# Patient Record
Sex: MALE | Race: WHITE
[De-identification: names, ages, dates, MRNs, and addresses within clinical notes are randomized per-mention and may not be internally consistent; named-entity substitution may affect disease eponyms.]

---

## 2020-09-05 ENCOUNTER — HOSPITAL ENCOUNTER (EMERGENCY)
Dept: HOSPITAL 61 - ER | Age: 30
Discharge: HOME | End: 2020-09-05
Payer: SELF-PAY

## 2020-09-05 VITALS — WEIGHT: 198.42 LBS | BODY MASS INDEX: 25.46 KG/M2 | HEIGHT: 74 IN

## 2020-09-05 VITALS — SYSTOLIC BLOOD PRESSURE: 147 MMHG | DIASTOLIC BLOOD PRESSURE: 74 MMHG
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
## 2020-09-05 DIAGNOSIS — R60.0: ICD-10-CM
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
## 2020-09-05 DIAGNOSIS — F17.200: ICD-10-CM

## 2020-09-05 DIAGNOSIS — Z90.89: ICD-10-CM

## 2020-09-05 DIAGNOSIS — S93.492A: Primary | ICD-10-CM
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
## 2020-09-05 PROCEDURE — 73610 X-RAY EXAM OF ANKLE: CPT

## 2020-09-05 PROCEDURE — 99284 EMERGENCY DEPT VISIT MOD MDM: CPT

## 2020-09-05 PROCEDURE — L4350 ANKLE CONTROL ORTHO PRE OTS: HCPCS

## 2020-09-05 NOTE — RAD
ANKLE LEFT 3V 9/5/2020 5:09 PM

 

INDICATION: Pain

 

COMPARISON: None available.

 

TECHNIQUE:  3 views of the left ankle are provided.

 

FINDINGS/

IMPRESSION:

There is no acute fracture or dislocation. Joint spaces are maintained. 

Bone mineralization is within normal limits. Regional soft tissues are 

within normal limits. There is no soft tissue gas or osseous erosion. No 

radiopaque foreign body.

 

Electronically signed by: Nancy Gasca MD (9/5/2020 5:31 PM) 

[facility]

## 2020-09-05 NOTE — PHYS DOC
Past Medical History


Past Medical History:  No Pertinent History


Past Surgical History:  Appendectomy


Smoking Status:  Current Every Day Smoker


Alcohol Use:  Occasionally





General Adult


EDM:


Chief Complaint:  ANKLE PROBLEM





HPI:


HPI:





Patient is a 29  year old male who presents to the ED today complaining of mild 

intermittent left lateral ankle pain that began around noon when he fell into a 

hole.  Patient denies hitting his head on the ground.  He states his ankle 

twisted.  Reports the ankle pain is throbbing and intermittent worse on 

ambulation.  Denies anything specifically relieving his pain.





Review of Systems:


Review of Systems:


Constitutional:   Denies fever or chills. []





Musculoskeletal:   Reports left ankle pain


Integument:   Denies rash. [] 


Neurologic:   Denies headache, focal weakness or sensory changes. [] 


 


Psychiatric:  Denies depression or anxiety. []





Heart Score:


Risk Factors:


Risk Factors:  DM, Current or recent (<one month) smoker, HTN, HLP, family 

history of CAD, obesity.


Risk Scores:


Score 0 - 3:  2.5% MACE over next 6 weeks - Discharge Home


Score 4 - 6:  20.3% MACE over next 6 weeks - Admit for Clinical Observation


Score 7 - 10:  72.7% MACE over next 6 weeks - Early Invasive Strategies





Current Medications:





Current Medications








 Medications


  (Trade)  Dose


 Ordered  Sig/Abeba  Start Time


 Stop Time Status Last Admin


Dose Admin


 


 Acetaminophen/


 Hydrocodone Bitart


  (Lortab 5/325)  1 tab  1X  ONCE  9/5/20 18:00


 9/5/20 18:01 DC 9/5/20 18:14


1 TAB


 


 Naproxen


  (Naprosyn)  500 mg  1X  STAT  9/5/20 17:27


 9/5/20 17:42 DC 9/5/20 18:14


500 MG











Allergies:


Allergies:





Allergies








Coded Allergies Type Severity Reaction Last Updated Verified


 


  No Known Drug Allergies    9/5/20 No











Physical Exam:


PE:





Constitutional: Well developed, well nourished, no acute distress, non-toxic 

appearance. []


Extremities: Left ankle with no obvious deformity.  Soft tissue swelling noted. 

 Tenderness on palpation of the left lateral ankle.  Full active as well as 

passive range of motion to the left ankle.  +2 left pedal pulse.  Cap refill 

less than 2 seconds to left toes.


Neurologic: Alert and oriented X 3, normal motor function, normal sensory 

function, no focal deficits noted. []


Psychologic: Affect normal, judgement normal, mood normal. []





Current Patient Data:


Vital Signs:





                                   Vital Signs








  Date Time  Temp Pulse Resp B/P (MAP) Pulse Ox O2 Delivery O2 Flow Rate FiO2


 


9/5/20 18:14      Room Air  


 


9/5/20 17:09 100.7 122 20 147/74 (98) 98   





 100.7       











EKG:


EKG:


[]





Radiology/Procedures:


Radiology/Procedures:


[]PROCEDURE: ANKLE LEFT 3V





ANKLE LEFT 3V 9/5/2020 5:09 PM


 


INDICATION: Pain


 


COMPARISON: None available.


 


TECHNIQUE:  3 views of the left ankle are provided.


 


FINDINGS/


IMPRESSION:


There is no acute fracture or dislocation. Joint spaces are maintained. 


Bone mineralization is within normal limits. Regional soft tissues are 


within normal limits. There is no soft tissue gas or osseous erosion. No 


radiopaque foreign body.


 


Electronically signed by: Ritika Gasca MD (9/5/2020 5:31 PM) 


[facility]














DICTATED and SIGNED BY:     RITIKA GASCA MD


DATE:     09/05/20 1731





Course & Med Decision Making:


Course & Med Decision Making


Pertinent Labs and Imaging studies reviewed. (See chart for details)





This is a 29-year-old male patient presenting to the ED today with left ankle 

pain that began after he fell into a hole.  Left ankle x-rays interpreted by 

radiologist are negative for any acute findings.  Ace bandage applied to the 

left ankle as well as Aircast, neurovascular exam done by me is negative, ice 

elevation encouraged.  Follow-up with orthopedic doctor in 1 week





Dragon Disclaimer:


Dragon Disclaimer:


This electronic medical record was generated, in whole or in part, using a voice

 recognition dictation system.





Departure


Departure


Impression:  


   Primary Impression:  


   Left ankle sprain


   Qualified Codes:  S93.402A - Sprain of unspecified ligament of left ankle, 

   initial encounter


   Additional Impression:  


   Fall into hole


   Qualified Codes:  W17.2XXA - Fall into hole, initial encounter


Disposition:  01 HOME, SELF-CARE


Condition:  STABLE


Referrals:  


NON,STAFF (PCP)


follow up in 2 week





TYE YAN MD


Patient Instructions:  Ankle Sprain, Acute, with Phase I Rehab-Sports[facility]





Additional Instructions:  


You were seen for left ankle pain.  Your left ankle x-rays are negative for any 

acute findings.  You likely have left ankle sprain.  Try to ice and elevate the 

extremity.  Take over-the-counter pain medicine as needed for pain.  Follow-up 

with your own doctor or the provided orthopedic doctor in 2 weeks if pain 

persist





Justicifation of Admission Dx:


Justifications for Admission:


Justification of Admission Dx:  N/A











SANDRA MUNOZ               Sep 5, 2020 18:26

## 2021-11-20 ENCOUNTER — HOSPITAL ENCOUNTER (EMERGENCY)
Dept: HOSPITAL 61 - ER | Age: 31
Discharge: HOME | End: 2021-11-20
Payer: COMMERCIAL

## 2021-11-20 VITALS — SYSTOLIC BLOOD PRESSURE: 128 MMHG | DIASTOLIC BLOOD PRESSURE: 76 MMHG

## 2021-11-20 VITALS — HEIGHT: 74 IN | WEIGHT: 189.6 LBS | BODY MASS INDEX: 24.33 KG/M2

## 2021-11-20 DIAGNOSIS — K04.7: Primary | ICD-10-CM

## 2021-11-20 DIAGNOSIS — F17.200: ICD-10-CM

## 2021-11-20 PROCEDURE — 99283 EMERGENCY DEPT VISIT LOW MDM: CPT

## 2021-11-20 NOTE — PHYS DOC
Past Medical History


Past Medical History:  No Pertinent History


Past Surgical History:  Appendectomy


Smoking Status:  Current Every Day Smoker


Alcohol Use:  None





General Adult


EDM:


Chief Complaint:  DENTAL PROBLEM





HPI:


HPI:





Patient is a 31  year old male who presents with with many dental caries and 

broken teeth with 2 days of left facial swelling and pain.  He states that 

Tylenol is helping his pain.  He states he just got insurance who does not have 

a primary care or dentist.  He states he is needing an antibiotic and he will 

follow up with a dentist as soon as possible.  He denies fever, nausea, 

vomiting, diarrhea, dizziness, chest pain, shortness of air.  Rates his pain a 5

out of 10 at this time.





Review of Systems:


Review of Systems:


Constitutional:   Denies fever or chills. []


Eyes:   Denies change in visual acuity. []


HENT:   Denies nasal congestion or sore throat. + Dental pain [] 


Respiratory:   Denies cough or shortness of breath. [] 


Cardiovascular:   Denies chest pain or + facial edema. [] 


GI:   Denies abdominal pain, nausea, vomiting, bloody stools or diarrhea. [] 


:  Denies dysuria. [] 


Musculoskeletal:   Denies back pain or joint pain. [] 


Integument:   Denies rash. [] 


Neurologic:   Denies headache, focal weakness or sensory changes. [] 


Endocrine:   Denies polyuria or polydipsia. [] 


Lymphatic:  Denies swollen glands. [] 


Psychiatric:  Denies depression or anxiety. []





Heart Score:


C/O Chest Pain:  No





Allergies:


Allergies:





Allergies








Coded Allergies Type Severity Reaction Last Updated Verified


 


  No Known Drug Allergies    9/5/20 No











Physical Exam:


PE:





Constitutional: Well developed, well nourished, no acute distress, non-toxic 

appearance. []


HENT: Normocephalic, atraumatic, bilateral external ears normal, oropharynx 

moist, no oral exudates, nose normal.  Many loose and missing and broken teeth 

throughout his whole mouth.  Left-sided facial swelling without redness or 

tenderness.  []


Eyes: PERRLA, EOMI, conjunctiva normal, no discharge. [] 


Neck: Normal range of motion, no tenderness, supple, no stridor. [] 


Cardiovascular:Heart rate regular rhythm, no murmur []


Lungs & Thorax:  Bilateral breath sounds clear to auscultation []


Abdomen: Bowel sounds normal, soft, no tenderness, no masses, no pulsatile 

masses. [] 


Skin: Warm, dry, no erythema, no rash. [] 


Back: No tenderness, no CVA tenderness. [] 


Extremities: No tenderness, no cyanosis, no clubbing, ROM intact, no edema. [] 


Neurologic: Alert and oriented X 3, normal motor function, normal sensory 

function, no focal deficits noted. []


Psychologic: Affect normal, judgement normal, mood normal. []





Current Patient Data:


Vital Signs:





                                   Vital Signs








  Date Time  Temp Pulse Resp B/P (MAP) Pulse Ox O2 Delivery O2 Flow Rate FiO2


 


11/20/21 16:04 98.7 111 16 128/76 (93) 97 Room Air  





 98.7       











EKG:


EKG:


[]





Radiology/Procedures:


Radiology/Procedures:


[]





Course & Med Decision Making:


Course & Med Decision Making


Pertinent Labs and Imaging studies reviewed. (See chart for details)





See HPI.  Alert and oriented x4.  Ambulatory steady gait.  Speaks in full clear 

sentences.  Many dental caries and broken teeth.  He has a left upper canine 

tooth that is broken and is painful.  He is left-sided 2+ facial swelling.  No 

redness to suggest facial cellulitis.  He is afebrile.  Speaks in full clear 

sentences.  Can open his mouth wide.  No pain under the tongue or swelling under

 the tongue.


[]





Dragon Disclaimer:


Dragon Disclaimer:


This electronic medical record was generated, in whole or in part, using a voice

 recognition dictation system.





Departure


Departure


Impression:  


   Primary Impression:  


   Dental abscess


Disposition:  01 HOME / SELF CARE / HOMELESS


Condition:  STABLE


Referrals:  


NO PCP (PCP)


Patient Instructions:  Dental Abscess





Additional Instructions:  


Take medication as prescribed and with food.  Continue taking Tylenol.  Follow-

up with a dentist as soon as possible.  If swelling worsens or you begin running

 a fever you need to return to the emergency room.


Scripts


Amoxicillin (AMOXICILLIN) 500 Mg Capsule


1 CAP PO TID, #[id number] CAP


   Prov: JUAN C MEDEROS         11/20/21











JUAN C MEDEROS            Nov 20, 2021 16:20